# Patient Record
Sex: FEMALE | Race: WHITE | NOT HISPANIC OR LATINO | Employment: FULL TIME | URBAN - METROPOLITAN AREA
[De-identification: names, ages, dates, MRNs, and addresses within clinical notes are randomized per-mention and may not be internally consistent; named-entity substitution may affect disease eponyms.]

---

## 2023-03-09 PROBLEM — E55.9 VITAMIN D DEFICIENCY: Status: ACTIVE | Noted: 2023-03-09

## 2024-05-31 ENCOUNTER — OFFICE VISIT (OUTPATIENT)
Dept: URGENT CARE | Facility: PHYSICIAN GROUP | Age: 29
End: 2024-05-31
Payer: COMMERCIAL

## 2024-05-31 ENCOUNTER — TELEPHONE (OUTPATIENT)
Dept: URGENT CARE | Facility: PHYSICIAN GROUP | Age: 29
End: 2024-05-31

## 2024-05-31 VITALS
TEMPERATURE: 98 F | RESPIRATION RATE: 18 BRPM | SYSTOLIC BLOOD PRESSURE: 134 MMHG | DIASTOLIC BLOOD PRESSURE: 80 MMHG | BODY MASS INDEX: 35.94 KG/M2 | OXYGEN SATURATION: 98 % | HEIGHT: 71 IN | HEART RATE: 89 BPM | WEIGHT: 256.73 LBS

## 2024-05-31 DIAGNOSIS — J02.9 PHARYNGITIS, UNSPECIFIED ETIOLOGY: ICD-10-CM

## 2024-05-31 DIAGNOSIS — U07.1 COVID-19: ICD-10-CM

## 2024-05-31 LAB
FLUAV RNA SPEC QL NAA+PROBE: NEGATIVE
FLUBV RNA SPEC QL NAA+PROBE: NEGATIVE
RSV RNA SPEC QL NAA+PROBE: NEGATIVE
S PYO DNA SPEC NAA+PROBE: NOT DETECTED
SARS-COV-2 RNA RESP QL NAA+PROBE: POSITIVE

## 2024-05-31 PROCEDURE — 0241U POCT CEPHEID COV-2, FLU A/B, RSV - PCR: CPT | Performed by: NURSE PRACTITIONER

## 2024-05-31 PROCEDURE — 99213 OFFICE O/P EST LOW 20 MIN: CPT | Performed by: NURSE PRACTITIONER

## 2024-05-31 PROCEDURE — 3079F DIAST BP 80-89 MM HG: CPT | Performed by: NURSE PRACTITIONER

## 2024-05-31 PROCEDURE — 87651 STREP A DNA AMP PROBE: CPT | Performed by: NURSE PRACTITIONER

## 2024-05-31 PROCEDURE — 3075F SYST BP GE 130 - 139MM HG: CPT | Performed by: NURSE PRACTITIONER

## 2024-05-31 RX ORDER — IBUPROFEN 200 MG
200 TABLET ORAL EVERY 6 HOURS PRN
COMMUNITY

## 2024-05-31 RX ORDER — ACETAMINOPHEN 325 MG/1
650 TABLET ORAL EVERY 4 HOURS PRN
COMMUNITY

## 2024-05-31 ASSESSMENT — ENCOUNTER SYMPTOMS
COUGH: 1
VOMITING: 0
SHORTNESS OF BREATH: 1
NAUSEA: 0
HEMOPTYSIS: 0
DIARRHEA: 0
SORE THROAT: 1
FEVER: 0
WHEEZING: 0

## 2024-06-01 NOTE — PROGRESS NOTES
Subjective:     Dipti Magdaleno is a 29 y.o. female who presents for Sore Throat (Cough  X 3  day )      Similar tp strep; Tylenol cold and flu. Ibuprofen.    Cough  This is a new problem. Associated symptoms include a sore throat and shortness of breath. Pertinent negatives include no ear pain, fever, hemoptysis or wheezing.   Pharyngitis   This is a new problem. The current episode started in the past 7 days. The pain is at a severity of 3/10. Associated symptoms include coughing and shortness of breath. Pertinent negatives include no diarrhea, ear pain or vomiting.       Past Medical History:   Diagnosis Date    PENG (generalized anxiety disorder) 3/13/2023    Migraine     No significant past medical history        History reviewed. No pertinent surgical history.    Social History     Socioeconomic History    Marital status: Single     Spouse name: Not on file    Number of children: Not on file    Years of education: Not on file    Highest education level: Some college, no degree   Occupational History    Not on file   Tobacco Use    Smoking status: Never    Smokeless tobacco: Never   Vaping Use    Vaping status: Never Used   Substance and Sexual Activity    Alcohol use: Yes     Comment: once a month/wine    Drug use: No    Sexual activity: Not Currently     Partners: Male     Birth control/protection: Implant, Condom   Other Topics Concern    Not on file   Social History Narrative    Not on file     Social Determinants of Health     Financial Resource Strain: Low Risk  (3/9/2023)    Overall Financial Resource Strain (CARDIA)     Difficulty of Paying Living Expenses: Not hard at all   Food Insecurity: No Food Insecurity (3/9/2023)    Hunger Vital Sign     Worried About Running Out of Food in the Last Year: Never true     Ran Out of Food in the Last Year: Never true   Transportation Needs: No Transportation Needs (3/9/2023)    PRAPARE - Transportation     Lack of Transportation (Medical): No     Lack of  Transportation (Non-Medical): No   Physical Activity: Patient Declined (3/9/2023)    Exercise Vital Sign     Days of Exercise per Week: Patient declined     Minutes of Exercise per Session: Patient declined   Stress: Patient Declined (3/9/2023)    [employer] [place] [employer] - Occupational Stress Questionnaire     Feeling of Stress : Patient declined   Social Connections: Socially Isolated (3/9/2023)    Social Connection and Isolation Panel [NHANES]     Frequency of Communication with Friends and Family: More than three times a week     Frequency of Social Gatherings with Friends and Family: More than three times a week     Attends Hoahaoism Services: Never     Active Member of Clubs or Organizations: No     Attends Club or Organization Meetings: Never     Marital Status:    Intimate Partner Violence: Not on file   Housing Stability: Unknown (3/9/2023)    Housing Stability Vital Sign     Unable to Pay for Housing in the Last Year: No     Number of Places Lived in the Last Year: Not on file     Unstable Housing in the Last Year: No        Family History   Problem Relation Age of Onset    Heart Disease Mother         double heart transplant; valve issue that was never repaired    No Known Problems Father     No Known Problems Sister     No Known Problems Brother     No Known Problems Brother     No Known Problems Daughter     Cancer Neg Hx     Ovarian Cancer Neg Hx     Tubal Cancer Neg Hx     Peritoneal Cancer Neg Hx     Colorectal Cancer Neg Hx     Breast Cancer Neg Hx     Bilateral Breast Cancer Neg Hx         No Known Allergies    Review of Systems   Constitutional:  Negative for fever.   HENT:  Positive for sore throat. Negative for ear pain.    Respiratory:  Positive for cough and shortness of breath. Negative for hemoptysis and wheezing.    Gastrointestinal:  Negative for diarrhea, nausea and vomiting.   All other systems reviewed and are negative.       Objective:   /80   Pulse 89    "Temp 36.7 °C (98 °F) (Temporal)   Resp 18   Ht 1.803 m (5' 11\")   Wt 116 kg (256 lb 11.6 oz)   SpO2 98%   BMI 35.81 kg/m²     Physical Exam    Assessment/Plan:   1. COVID-19    2. Pharyngitis, unspecified etiology  - POCT CoV-2, Flu A/B, RSV by PCR  - POCT CEPHEID GROUP A STREP - PCR    Other orders  - ibuprofen (MOTRIN) 200 MG Tab; Take 200 mg by mouth every 6 hours as needed for Headache, Fever or Mild Pain.  - acetaminophen (TYLENOL) 325 MG Tab; Take 650 mg by mouth every four hours as needed for Mild Pain, Moderate Pain or Fever.    Symptomatic care.  -Oral hydration and rest.   -Cough control: nonpharmacologic options for cough relief such as throat lozenges, hot tea, honey.  -Over the counter expectorant as directed; Guaifenesin (Mucinex).  -Tylenol or ibuprofen for pain and fever as directed.   -Warm salt water gargles.  -OTC Throat lozenges or spray (Cepacol).    Seek emergency medical care immediately for: Trouble breathing, persistent pain or pressure in the chest, confusion, inability to wake or stay awake, bluish lips or face, persistent tachycardia (fast heart rate), prolonged dizziness, persistent high grade fevers. Follow up for prolonged cough, persistent wheezing, persistent throat pain, difficulty swallowing, persistent fevers, leg swelling, or any other concerns. Follow up with your Primary Care Provider.     -Discussed viral etiology. COVID S&S, and self isolation guidelines. S&S of PNA with follow up. Stable Vitals.    Differential diagnosis, natural history, supportive care, and indications for immediate follow-up discussed.  " Negative Negative, Invalid    RSV, PCR Negative Negative, Invalid   POCT CEPHEID GROUP A STREP - PCR   Result Value Ref Range    POC Group A Strep, PCR Not Detected Not Detected, Invalid   Symptomatic care.  -Oral hydration and rest.   -Cough control: nonpharmacologic options for cough relief such as throat lozenges, hot tea, honey.  -Over the counter expectorant as directed; Guaifenesin (Mucinex).  -Tylenol or ibuprofen for pain and fever as directed.   -Warm salt water gargles.  -OTC Throat lozenges or spray (Cepacol).    Seek emergency medical care immediately for: Trouble breathing, persistent pain or pressure in the chest, confusion, inability to wake or stay awake, bluish lips or face, persistent tachycardia (fast heart rate), prolonged dizziness, persistent high grade fevers. Follow up for prolonged cough, persistent wheezing, persistent throat pain, difficulty swallowing, persistent fevers, leg swelling, or any other concerns. Follow up with your Primary Care Provider.     -Discussed viral etiology, symptomatic care, COVID S&S, and S&S of PNA with follow up. Stable Vitals.    Differential diagnosis, natural history, supportive care, and indications for immediate follow-up discussed.

## 2024-10-01 ENCOUNTER — IMMUNIZATION (OUTPATIENT)
Dept: OCCUPATIONAL MEDICINE | Facility: CLINIC | Age: 29
End: 2024-10-01

## 2024-10-01 DIAGNOSIS — Z23 NEED FOR VACCINATION: Primary | ICD-10-CM

## 2024-10-01 PROCEDURE — 90656 IIV3 VACC NO PRSV 0.5 ML IM: CPT | Performed by: PREVENTIVE MEDICINE

## 2025-07-28 ENCOUNTER — OFFICE VISIT (OUTPATIENT)
Dept: URGENT CARE | Facility: PHYSICIAN GROUP | Age: 30
End: 2025-07-28
Payer: COMMERCIAL

## 2025-07-28 ENCOUNTER — PHARMACY VISIT (OUTPATIENT)
Dept: PHARMACY | Facility: MEDICAL CENTER | Age: 30
End: 2025-07-28
Payer: COMMERCIAL

## 2025-07-28 VITALS
OXYGEN SATURATION: 99 % | BODY MASS INDEX: 38.01 KG/M2 | TEMPERATURE: 97.5 F | HEART RATE: 75 BPM | DIASTOLIC BLOOD PRESSURE: 88 MMHG | HEIGHT: 69 IN | RESPIRATION RATE: 14 BRPM | WEIGHT: 256.62 LBS | SYSTOLIC BLOOD PRESSURE: 126 MMHG

## 2025-07-28 DIAGNOSIS — G43.701 CHRONIC MIGRAINE WITHOUT AURA WITH STATUS MIGRAINOSUS, NOT INTRACTABLE: ICD-10-CM

## 2025-07-28 DIAGNOSIS — L70.0 ACNE VULGARIS: ICD-10-CM

## 2025-07-28 PROCEDURE — 3079F DIAST BP 80-89 MM HG: CPT | Performed by: STUDENT IN AN ORGANIZED HEALTH CARE EDUCATION/TRAINING PROGRAM

## 2025-07-28 PROCEDURE — 99214 OFFICE O/P EST MOD 30 MIN: CPT | Performed by: STUDENT IN AN ORGANIZED HEALTH CARE EDUCATION/TRAINING PROGRAM

## 2025-07-28 PROCEDURE — RXMED WILLOW AMBULATORY MEDICATION CHARGE: Performed by: STUDENT IN AN ORGANIZED HEALTH CARE EDUCATION/TRAINING PROGRAM

## 2025-07-28 PROCEDURE — 3074F SYST BP LT 130 MM HG: CPT | Performed by: STUDENT IN AN ORGANIZED HEALTH CARE EDUCATION/TRAINING PROGRAM

## 2025-07-28 RX ORDER — CLINDAMYCIN AND BENZOYL PEROXIDE 10; 50 MG/G; MG/G
GEL TOPICAL
Qty: 50 G | Refills: 0 | Status: SHIPPED | OUTPATIENT
Start: 2025-07-28

## 2025-07-28 RX ORDER — SUMATRIPTAN 50 MG/1
TABLET, FILM COATED ORAL
Qty: 9 TABLET | Refills: 0 | Status: SHIPPED | OUTPATIENT
Start: 2025-07-28

## 2025-07-28 NOTE — Clinical Note
REFERRAL APPROVAL NOTICE         Sent on July 28, 2025                   Dipti Magdaleno  [address]  [facility] [address]                   Dear Ms. Magdaleno,    After a careful review of the medical information and benefit coverage, [facility] has processed your referral. See below for additional details.    If applicable, you must be actively enrolled with your insurance for coverage of the authorized service. If you have any questions regarding your coverage, please contact your insurance directly.    REFERRAL INFORMATION   Referral #:  [id number]  Referred-To Department    Referred-By Provider:  Dermatology    Benjy West D.O.   [facility]      [address]  [address]  [place] [address]  [phone] [address] [facility] [address]  [place] [id number] [phone]    Referral Start Date:  07/28/2025  Referral End Date:   07/28/2026             SCHEDULING  If you do not already have an appointment, please call [phone] to make an appointment.     MORE INFORMATION  If you do not already have a [employer] account, sign up at: [email or web address]  You can access your medical information, make appointments, see lab results, billing information, and more.  If you have questions regarding this referral, please contact  the [facility] Referrals department at:             [phone]. Monday - Friday 8:00AM - 5:00PM.     Sincerely,    [facility]

## 2025-07-28 NOTE — PROGRESS NOTES
Subjective:   CHIEF COMPLAINT  Chief Complaint   Patient presents with    Bump     X 6 days, painful, back of head, migraines started with the onset of bumps.       HPI    History of Present Illness  Dipti Magdaleno is a 30 y.o. female who presents for evaluation of bumps on the back of her head and migraines.    She has noticed several small, painful bumps on the back of her head (at the hairline) for the past 6 days. Initially, she attempted to squeeze one of the bumps, suspecting it to be an ingrown hair, but no pus or fluid was released. The bumps are painful and mildly pruritic.  She has not tried any topical medications for symptomatic relief.  No history of similar lesions on the back of her neck but does have acne.  She has not established with dermatology or primary care      Concurrently, she has been experiencing constant migraines for the past 5 days, which is unusual as she typically experiences 2 to 3 migraines per week. She reports no visual disturbances, slurred speech, or sensitivity to bright lights during her migraines. Her migraines are primarily located on the left side of her forehead and along the left Kameron cranium. She has a history of migraines but does not take any specific medication for them, only using ibuprofen as needed. She has never used triptans for her migraines.  She is not on birth control.    Positive ROS for increased sensation of warmth and chills but no objective fevers.  Has been feeling somewhat nauseous but no cough or cold-like symptoms.  Adamantly states no concerns for pregnancy            REVIEW OF SYSTEMS  General: Admits chills  GI: Admits nausea without vomiting  See HPI for further details.    PAST MEDICAL HISTORY  Patient Active Problem List    Diagnosis Date Noted    PENG (generalized anxiety disorder) 03/13/2023    Encounter for contraceptive management 03/09/2023    Vitamin D deficiency 03/09/2023    Other fatigue 03/09/2023    Elevated liver enzymes 03/09/2023  "   Menorrhagia with irregular cycle 03/25/2020    Overweight 03/25/2020       SURGICAL HISTORY  patient denies any surgical history    ALLERGIES  Allergies[1]    CURRENT MEDICATIONS  acetaminophen Tabs  clindamycin-benzoyl peroxide  ibuprofen Tabs  SUMAtriptan Tabs    SOCIAL HISTORY  Social History     Tobacco Use    Smoking status: Never    Smokeless tobacco: Never   Vaping Use    Vaping status: Never Used   Substance and Sexual Activity    Alcohol use: Yes     Comment: once a month/wine    Drug use: No    Sexual activity: Not Currently     Partners: Male     Birth control/protection: Implant, Condom       FAMILY HISTORY  Family History   Problem Relation Age of Onset    Heart Disease Mother         double heart transplant; valve issue that was never repaired    No Known Problems Father     No Known Problems Sister     No Known Problems Brother     No Known Problems Brother     No Known Problems Daughter     Cancer Neg Hx     Ovarian Cancer Neg Hx     Tubal Cancer Neg Hx     Peritoneal Cancer Neg Hx     Colorectal Cancer Neg Hx     Breast Cancer Neg Hx     Bilateral Breast Cancer Neg Hx           Objective:   PHYSICAL EXAM  VITAL SIGNS: /88 (BP Location: Right arm, Patient Position: Sitting, BP Cuff Size: Adult long)   Pulse 75   Temp 36.4 °C (97.5 °F) (Temporal)   Resp 14   Ht 1.753 m (5' 9\")   Wt 116 kg (256 lb 9.9 oz)   SpO2 99%   BMI 37.90 kg/m²     Gen: no acute distress, normal voice  Skin: dry, intact, moist mucosal membranes.  Multiple open and closed comedones along the chin.  Some inflammatory papules along the forehead.  Posterior neck additionally with some inflammatory papules and crusted over pustules.  No drainable abscesses.  No extending erythema or edema within the subcutaneous tissue.  Eyes: No conjunctival injection bilaterally.  Neck: Normal range of motion. No meningeal signs.   Lungs: No increased work of breathing.  CTAB w/ symmetric expansion  CV: RRR w/o murmurs or " clicks  Psych: normal affect, normal judgement, alert, awake    Assessment/Plan:     1. Chronic migraine without aura with status migrainosus, not intractable  SUMAtriptan (IMITREX) 50 MG Tab    Referral back to PCP      2. Acne vulgaris  clindamycin-benzoyl peroxide (BENZACLIN) gel    Referral to Dermatology    Referral back to PCP      1) physical exam patient appeared to have some underlying acne which extends posteriorly towards the neck.  No drainable abscess or signs of cellulitis  -Ordered benzalin gel  - Ordered a referral to establish with dermatology    2) chronic condition with acute exacerbation.  -Ordered trial of Imitrex  -Continue OTCs as needed for symptomatic relief  -Ordered referral to establish with primary care  -Return to urgent care any new/worsening symptoms or further questions or concerns.  Patient understood everything discussed.  All questions were answered.          Please note that this dictation was created using voice recognition software. I have made a reasonable attempt to correct obvious errors, but I expect that there are errors of grammar and possibly content that I did not discover before finalizing the note.                [1] No Known Allergies